# Patient Record
Sex: MALE | Race: WHITE | HISPANIC OR LATINO
[De-identification: names, ages, dates, MRNs, and addresses within clinical notes are randomized per-mention and may not be internally consistent; named-entity substitution may affect disease eponyms.]

---

## 2021-12-28 ENCOUNTER — TRANSCRIPTION ENCOUNTER (OUTPATIENT)
Age: 56
End: 2021-12-28

## 2022-11-02 ENCOUNTER — APPOINTMENT (OUTPATIENT)
Dept: ORTHOPEDIC SURGERY | Facility: CLINIC | Age: 57
End: 2022-11-02

## 2022-11-02 VITALS — WEIGHT: 197 LBS | HEIGHT: 64 IN | BODY MASS INDEX: 33.63 KG/M2

## 2022-11-02 DIAGNOSIS — J45.909 UNSPECIFIED ASTHMA, UNCOMPLICATED: ICD-10-CM

## 2022-11-02 DIAGNOSIS — Z78.9 OTHER SPECIFIED HEALTH STATUS: ICD-10-CM

## 2022-11-02 PROBLEM — Z00.00 ENCOUNTER FOR PREVENTIVE HEALTH EXAMINATION: Status: ACTIVE | Noted: 2022-11-02

## 2022-11-02 PROCEDURE — 99204 OFFICE O/P NEW MOD 45 MIN: CPT

## 2022-11-02 PROCEDURE — 72100 X-RAY EXAM L-S SPINE 2/3 VWS: CPT

## 2022-11-06 NOTE — DATA REVIEWED
[MRI] : MRI [Lumbar Spine] : lumbar spine [Report was reviewed and noted in the chart] : The report was reviewed and noted in the chart [I independently reviewed and interpreted images and report] : I independently reviewed and interpreted images and report [FreeTextEntry1] : I stop paperwork reviewed

## 2022-11-06 NOTE — DISCUSSION/SUMMARY
[de-identified] : Discussed and reviewed results of lumbar spine x--ray. Discussed and reviewed results of lumbar MRI, and pathology of his symptoms associated with significant stenosis L3-4 with post operative changes L4-5. I am referring the patient to pain management to discuss trying Lumbar Epidural Spine Injections for pain relief. Discussed possible surgical decompression dependent upon response to conservative care. Discussed continued medical mgmt and exercise based rehabilitation. Discussed judicious use otc nsaids prn. Discussed possible extension of decompression and psf if refractory to non operative treatment and symptoms are functionally incapacitating.Patient will f/u in 5-6 weeks to evaluate response to LESI. \par \td QUIROZ Acting as a Scribe for Dr. Degroot I, Sally Quiroz, attest that this documentation has been prepared under the direction and in the presence of Provider Chai Gooden MD.

## 2022-11-06 NOTE — HISTORY OF PRESENT ILLNESS
[de-identified] : 11/02/2022 - 57 y/o male presenting for an initial eval of lumbar. Complains of mid/low back pain. Reports b/l leg pain behind the knee, denies pain in b/l thighs.  No numbness/tingling or changes in strength in the lower extremities b/l.  Back pain > leg pain. HX of 3 back surgery's (herniated discs and fusions), most recent in 2013. He states his pain level is not as severe compared to preoperative baseline. Last Injection approximately 2017, which provided temporary relief. General health is good. Prolonged walking, sitting, and standing is problematic. Patient is not working. \par \par 11/02/2022 - The patient is a 56 year old male who presents today complaining of low back pain\par Date of Injury/Onset:  long time\par Pain:    At Rest:  6-7/10 \par With Activity:  9-10/10 \par Mechanism of injury: onset\par Quality of symptoms: sharp shooting \par Improves with:  resting\par Worse with:  prolonged standing, sitting and walking\par Prior treatment:  Dr Duenas GP\par Prior Imaging:  MRI SB\par Additional Information: Hx of lumbar Sx\par \par \par \par

## 2022-11-06 NOTE — PHYSICAL EXAM
[Flexion] : flexion [Extension] : extension [Normal Coordination] : normal coordination [Normal DTR UE/LE] : normal DTR UE/LE  [Normal Sensation] : normal sensation [Normal Mood and Affect] : normal mood and affect [Orientated] : orientated [Able to Communicate] : able to communicate [Normal Skin] : normal skin [No Rash] : no rash [No Ulcers] : no ulcers [No Lesions] : no lesions [No obvious lymphadenopathy in areas examined] : no obvious lymphadenopathy in areas examined [Well Developed] : well developed [Well Nourished] : well nourished [Peripheral vascular exam is grossly normal] : peripheral vascular exam is grossly normal [No Respiratory Distress] : no respiratory distress [de-identified] : Constitutional:\par - General Appearance:\par Unremarkable\par Body Habitus\par Well Developed\par Well Nourished\par Body Habitus\par No Deformities\par Well Groomed\par Ability To communicate:\par Normal\par Neurologic:\par Global sensation is intact to upper and lower extremities. See examination of Neck and/or Spine\par for exceptions.\par Orientation to Time, Place and Person is: Normal\par Mood And Affect is Normal\par Skin:\par - Head/Face, Right Upper/Lower Extremity, Left Upper/Lower Extremity: Normal\par See Examination of Neck and/or Spine for exceptions\par Cardiovascular:\par Peripheral Cardiovascular System is Normal\par Palpation of Lymph Nodes:\par Normal Palpation of lymph nodes in: Axilla, Cervical, Inguinal\par Abnormal Palpation of lymph nodes in: None  [] : non-antalgic

## 2022-11-10 ENCOUNTER — APPOINTMENT (OUTPATIENT)
Dept: PAIN MANAGEMENT | Facility: CLINIC | Age: 57
End: 2022-11-10

## 2022-11-10 VITALS — WEIGHT: 197 LBS | BODY MASS INDEX: 33.63 KG/M2 | HEIGHT: 64 IN

## 2022-11-10 PROCEDURE — 99204 OFFICE O/P NEW MOD 45 MIN: CPT

## 2022-11-10 RX ORDER — SERTRALINE HYDROCHLORIDE 100 MG/1
100 TABLET, FILM COATED ORAL
Qty: 90 | Refills: 0 | Status: ACTIVE | COMMUNITY
Start: 2022-05-31

## 2022-11-10 RX ORDER — QUETIAPINE FUMARATE 25 MG/1
25 TABLET ORAL
Qty: 90 | Refills: 0 | Status: ACTIVE | COMMUNITY
Start: 2022-09-01

## 2022-11-10 RX ORDER — OFLOXACIN 3 MG/ML
0.3 SOLUTION/ DROPS OPHTHALMIC
Qty: 5 | Refills: 0 | Status: ACTIVE | COMMUNITY
Start: 2022-08-04

## 2022-11-10 RX ORDER — FAMOTIDINE 40 MG/1
40 TABLET, FILM COATED ORAL
Qty: 30 | Refills: 0 | Status: ACTIVE | COMMUNITY
Start: 2022-08-02

## 2022-11-10 RX ORDER — FENOFIBRATE 200 MG/1
200 CAPSULE ORAL
Qty: 90 | Refills: 0 | Status: ACTIVE | COMMUNITY
Start: 2022-10-19

## 2022-11-10 RX ORDER — DOXYLAMINE SUCCINATE 25 MG/1
25 TABLET ORAL
Qty: 90 | Refills: 0 | Status: ACTIVE | COMMUNITY
Start: 2022-04-14

## 2022-11-10 RX ORDER — OMEPRAZOLE 40 MG/1
40 CAPSULE, DELAYED RELEASE ORAL
Qty: 90 | Refills: 0 | Status: ACTIVE | COMMUNITY
Start: 2022-09-01

## 2022-11-10 RX ORDER — AMITRIPTYLINE HYDROCHLORIDE 25 MG/1
25 TABLET, FILM COATED ORAL
Qty: 30 | Refills: 0 | Status: DISCONTINUED | COMMUNITY
Start: 2022-05-05

## 2022-11-10 RX ORDER — ALBUTEROL SULFATE 90 UG/1
108 (90 BASE) INHALANT RESPIRATORY (INHALATION)
Qty: 6 | Refills: 0 | Status: ACTIVE | COMMUNITY
Start: 2022-03-17

## 2022-11-10 RX ORDER — LEVOCETIRIZINE DIHYDROCHLORIDE 5 MG/1
5 TABLET ORAL
Qty: 30 | Refills: 0 | Status: ACTIVE | COMMUNITY
Start: 2022-09-06

## 2022-11-10 RX ORDER — CELECOXIB 100 MG/1
100 CAPSULE ORAL
Qty: 60 | Refills: 0 | Status: ACTIVE | COMMUNITY
Start: 2022-10-24

## 2022-11-10 NOTE — ASSESSMENT
[FreeTextEntry1] : This is LUDY NGUYEN,  a 56 year-old male here for lower back pain with impairment in ADLs and functionality.  The pain has not responded to conservative care including NSAID therapy and/or physical therapy.  There is no bleeding tendency, unstable medical condition, or systemic infection.\par \par Based on history and physical, I suspect there are likely multiple pain generators, including central and foraminal stenosis at L3-4, foraminal stenosis at L3/4, L4/5, and facet arthropathy at L3/4, L4/5.  There may also be an SIJ component as well.  \par \par I discussed the above at length with the patient, including prognosis, complications, and red flag symptoms.  We discussed a graded and multidisciplinary treatment plan, including physical therapy/HEP, medications, and/or interventional procedures.  The risks and benefits of each of these were addressed and all questions answered to the patient's apparent satisfaction.  After this discussion, the following plan was developed with the patient: \par \par 1. PT: Emphasized importance of PT/HEP as a mainstay of treatment. Rx provided today.\par 2.  Medication(s): Start gabapentin.  Side effects and titration schedule provided.  Start with 300mg qhs x3d, increase to 300mg BID x3d, then 300mg TID.  We discussed that this medication takes time to take effect and that failure to feel immediate relief does not indicate treatment failure.\par 3.  Imaging/Labs: reviewed as above\par 4.  Procedure(s): Recommend b/l L4/5 TFESI with fluoroscopic guidance. If no relief, will attempt MBB/RFA lf L3/4, L4/5 joints. \par 5.  Follow-Up: for procedure, then 2 weeks after. \par \par The risks, benefits and alternatives of the proposed procedure were explained in detail with the patient. The risks outlined include but are not limited to infection, bleeding, post- dural puncture headache (for SHOAIB), nerve injury, a temporary increase in pain, failure to resolve symptoms, allergic reaction, and possible elevation of blood sugar in diabetics if using corticosteroid.  All questions were answered to patient's apparent satisfaction and he/she verbalized an understanding.\par \par Medications have been discussed and reconciled, adverse reactions and side effects have been reviewed with patient.  When appropriate, iSTOP/ has been checked and any aberrations discussed with patient.  \par \par

## 2022-11-10 NOTE — REASON FOR VISIT
[FreeTextEntry2] : Patient is here for due to his lower back pain sine 2001. Patient was refer by Dr Giacomo Paula. Patient had epidural injection, surgeries and physical therapy.

## 2022-11-10 NOTE — PHYSICAL EXAM
[de-identified] : General:  Awake and alert in no acute distress\par Psych: normal mood and affect\par HEENT: NC/AT, normal visual tracking\par Pulmonary: no increased work of breathing\par CV: extremities are warm and perfused\par Abd: non-distended\par Ext: no c/c/e\par \par Inspection: Non-antalgic gait\par \par Palpation: Tender at lumbar paraspinals\par \par ROM: \par Flexion - limited by pain\par Extension -  limited by pain\par Oblique Extension -  limited by pain\par Lateral Flexion -  limited by pain\par \par Strength:  HF, KE, KF, DF, PF, EHL all 5/5 \par \par Sensation: Intact at L2-S1 dermatomes bilaterally to light touch \par \par Reflexes: 2+/4 at bilateral Patellar (L2-4) and Achilles (S1).\par Downgoing Babinski bilaterally\par \par Special Tests: \par SLR: (R) - negative; (L) - positive\par Facet Loading: (R) - positive; (L) - positive\par GEENA:  (R) - positive; (L) - positive\par FADIR:  (R) - negative  ; (L) - negative  \par HIP SCOUR: (R) - negative; (L) - negative  \par SACRAL COMPRESSION: (R) - negative; (L) - negative \par GAENSLEN'S: (R) - positive; (L) - negative \par \par \par

## 2022-11-10 NOTE — HISTORY OF PRESENT ILLNESS
[de-identified] : 11/10/2022: This is LUDY NGUYEN, a pleasant 56 year-old male referred to my office by Dr. Gooden for initial evaluation of lower back pain. \par \par Location of maximal pain: lower back, bilaterally. no side preference\par Onset:  chronic, gradually worsening\par Provoking factors: prolonged standing and sitting\par Palliating factors: better with forward flexion\par Radiation pattern: down b/l posterior legs to calf\par Associated symptoms: denies numbness, tingling; + occasional subjective weakness\par Patient denies bowel/bladder incontinence, saddle anesthesia, fevers, chills, weight loss, or recent falls.  \par \par Current/Past Pain Medications:\par Celebrex currently - helpful\par Past: oxycodone, vicodin, percocet\par \par Prior Procedures/Treatments: \par Has had SHOAIB in the past (most recent 2017, [facility]) with some improvement\par \par History of back surgery: \par 2001, 2003, 20015 - back surgeries. \par \par \par Blood thinners:\par None\par \par Physical Therapy: \par Has trialed in the past \par \par --------------------------------------------------------\par Imaging Review:\par \par \par Lumbar MRI 4/2022 ([facility]) - images reviewed\par \par s/p L5/S1 lami\par HO along posterior elements at L2-L5\par DDD\par Paraspinal atrophy at L2-S1\par L3/4: disc bulge, severe Right greater than left ligamentum flavum thickening and severe facet arthropathy causing mod/severe central stenosis.  mild b/l foraminal stenosis\par L4/5: disc bulge.  s/p lami.  severe facet arthropathy with b/l foraminal stenosis\par L5/S1: s/p L5, S1 lami.  prominent anterior epidural fat\par \par All pertinent imaging independently reviewed and interpreted by me.  \par \par

## 2022-11-24 ENCOUNTER — NON-APPOINTMENT (OUTPATIENT)
Age: 57
End: 2022-11-24

## 2022-12-05 ENCOUNTER — APPOINTMENT (OUTPATIENT)
Dept: PAIN MANAGEMENT | Facility: CLINIC | Age: 57
End: 2022-12-05

## 2022-12-05 DIAGNOSIS — M47.816 SPONDYLOSIS W/OUT MYELOPATHY OR RADICULOPATHY, LUMBAR REGION: ICD-10-CM

## 2022-12-05 DIAGNOSIS — M48.061 SPINAL STENOSIS, LUMBAR REGION WITHOUT NEUROGENIC CLAUDICATION: ICD-10-CM

## 2022-12-05 PROCEDURE — 64483 NJX AA&/STRD TFRM EPI L/S 1: CPT | Mod: 50

## 2022-12-05 NOTE — PROCEDURE
[FreeTextEntry3] : Date of Service: 12/05/2022   \par Account: [id number]  \par Patient: LUDY NGUYEN  \par YOB: 1965  \par Age: 56 year  \par Surgeon:      Brisa Del Rosario DO\par Assistant:    None  \par Specimen: None\par Complications: None\par EBL: Minimal \par \par Pre-Operative Diagnosis: Lumbosacral Radiculitis (M54.17)  \par Post Operative Diagnosis: Same  \par \par \par Procedure:  Bilateral L4/L5 transforaminal epidural steroid injection under fluoroscopic guidance.  \par Anesthesia: Local\par \par The risks, benefits and alternatives of the procedure were discussed with the patient. The patient was given opportunity to ask questions regarding the procedure, its indications and the associated risks. The risks of the procedure discussed include but are not limited to infection, bleeding, allergic reactions, nerve injuries, and side effects. The patient showed understanding and wished to proceed.\par  \par Preparation\par The patient was placed in the prone position on the fluoroscopic table with a pillow under the abdomen. Routine monitors were applied. The back was prepped and draped in the usual sterile fashion and sterile technique was adhered to throughout the entire procedure. \par  \par The L4 vertebral body was identified under fluoroscopic guidance. The vertebral body end plates were aligned and the right L4/L5 foramen was brought into view using an oblique angulation of the C-arm. The skin and subcutaneous tissues were infiltrated with 1% Lidocaine. After adequate local anesthesia a 3.5 inch 22 gauge Quincke needle was inserted at the right L4/L5 foramen. The needle was advanced under fluoroscopic guidance to the superolateral portion of the foramen. Needle position was confirmed in the AP and lateral views. After negative aspiration for heme and CSF, 1 cc Omnipaque contrast dye was injected under live fluoroscopy and no intravascular or intrathecal spread was noted. Epidural spread of contrast was confirmed in the AP and lateral views. The patient was injected with a total of 10 mg PF dexamethasone and 1 cc PF injectable normal saline. \par \par The above procedure was then repeated on the left L4/L5 foramen.  \par  \par The patient tolerated the procedure well. Vital signs remained stable throughout. Post-operative exam did not reveal any new neurological deficits. Patient was sent to the recovery room in a stable condition. The patient was asked to follow up in 2 weeks.\par  \par The patient was instructed to call with fever, chills, increased pain, redness or swelling at the injection site, or numbness or weakness in the leg.\par

## 2022-12-09 ENCOUNTER — OFFICE (OUTPATIENT)
Dept: URBAN - METROPOLITAN AREA CLINIC 8 | Facility: CLINIC | Age: 57
Setting detail: OPHTHALMOLOGY
End: 2022-12-09
Payer: MEDICARE

## 2022-12-09 DIAGNOSIS — H25.13: ICD-10-CM

## 2022-12-09 DIAGNOSIS — H52.4: ICD-10-CM

## 2022-12-09 DIAGNOSIS — H17.9: ICD-10-CM

## 2022-12-09 DIAGNOSIS — H16.223: ICD-10-CM

## 2022-12-09 DIAGNOSIS — H40.052: ICD-10-CM

## 2022-12-09 PROCEDURE — 92015 DETERMINE REFRACTIVE STATE: CPT | Performed by: OPHTHALMOLOGY

## 2022-12-09 PROCEDURE — 92250 FUNDUS PHOTOGRAPHY W/I&R: CPT | Performed by: OPHTHALMOLOGY

## 2022-12-09 PROCEDURE — 92004 COMPRE OPH EXAM NEW PT 1/>: CPT | Performed by: OPHTHALMOLOGY

## 2022-12-09 ASSESSMENT — KERATOMETRY
OD_K2POWER_DIOPTERS: 41.75
OD_K1POWER_DIOPTERS: 39.50
OD_AXISANGLE_DEGREES: 083

## 2022-12-09 ASSESSMENT — SPHEQUIV_DERIVED
OD_SPHEQUIV: 0.375
OD_SPHEQUIV: 0.375
OS_SPHEQUIV: 1.25
OS_SPHEQUIV: 1.25
OD_SPHEQUIV: 0.125
OS_SPHEQUIV: 1.625

## 2022-12-09 ASSESSMENT — REFRACTION_MANIFEST
OD_CYLINDER: -0.25
OD_SPHERE: +0.50
OS_AXIS: 145
OS_VA2: 20/25(J1)
OD_VA2: 20/25(J1)
OS_CYLINDER: -3.50
OS_AXIS: 145
OD_VA2: 20/25(J1)
OD_ADD: +2.50
OS_VA1: 20/30-
OD_SPHERE: +0.50
OD_AXIS: 180
OD_VA1: 20/25
OS_ADD: +2.00
OU_VA: 20/25
OS_SPHERE: +3.00
OU_VA: 20/25
OD_CYLINDER: -0.25
OD_VA1: 20/25
OS_VA2: 20/25(J1)
OS_VA1: 20/30-
OD_AXIS: 180
OD_ADD: +2.00
OS_SPHERE: +3.00
OS_CYLINDER: -3.50
OS_ADD: +2.50

## 2022-12-09 ASSESSMENT — AXIALLENGTH_DERIVED
OD_AL: 24.5383
OD_AL: 24.5383
OD_AL: 24.6441

## 2022-12-09 ASSESSMENT — CONFRONTATIONAL VISUAL FIELD TEST (CVF)
OD_FINDINGS: FULL
OS_FINDINGS: FULL

## 2022-12-09 ASSESSMENT — REFRACTION_AUTOREFRACTION
OS_AXIS: 146
OD_CYLINDER: -0.25
OD_SPHERE: +0.25
OD_AXIS: 180
OS_CYLINDER: -4.25
OS_SPHERE: +3.75

## 2022-12-09 ASSESSMENT — TONOMETRY: OD_IOP_MMHG: 16

## 2022-12-09 ASSESSMENT — VISUAL ACUITY
OD_BCVA: 20/40-2
OS_BCVA: 20/30-

## 2022-12-09 ASSESSMENT — SUPERFICIAL PUNCTATE KERATITIS (SPK)
OS_SPK: 1+
OD_SPK: 1+

## 2022-12-19 ENCOUNTER — APPOINTMENT (OUTPATIENT)
Dept: PAIN MANAGEMENT | Facility: CLINIC | Age: 57
End: 2022-12-19